# Patient Record
Sex: MALE | Race: WHITE | NOT HISPANIC OR LATINO | Employment: OTHER | URBAN - NONMETROPOLITAN AREA
[De-identification: names, ages, dates, MRNs, and addresses within clinical notes are randomized per-mention and may not be internally consistent; named-entity substitution may affect disease eponyms.]

---

## 2018-01-02 ENCOUNTER — HOSPITAL ENCOUNTER (EMERGENCY)
Facility: HOSPITAL | Age: 68
Discharge: HOME | End: 2018-01-02
Attending: PHYSICIAN ASSISTANT | Admitting: PHYSICIAN ASSISTANT
Payer: COMMERCIAL

## 2018-01-02 VITALS
HEIGHT: 71 IN | TEMPERATURE: 97.3 F | SYSTOLIC BLOOD PRESSURE: 144 MMHG | OXYGEN SATURATION: 94 % | DIASTOLIC BLOOD PRESSURE: 79 MMHG | RESPIRATION RATE: 20 BRPM

## 2018-01-02 DIAGNOSIS — J01.90 ACUTE RHINOSINUSITIS: ICD-10-CM

## 2018-01-02 DIAGNOSIS — H69.92 DYSFUNCTION OF LEFT EUSTACHIAN TUBE: ICD-10-CM

## 2018-01-02 LAB
DEPRECATED S PYO AG THROAT QL EIA: NORMAL
FLUAV+FLUBV AG SPEC QL: NEGATIVE
FLUAV+FLUBV AG SPEC QL: NEGATIVE
SPECIMEN SOURCE: NORMAL
SPECIMEN SOURCE: NORMAL

## 2018-01-02 PROCEDURE — 87081 CULTURE SCREEN ONLY: CPT | Performed by: FAMILY MEDICINE

## 2018-01-02 PROCEDURE — G0463 HOSPITAL OUTPT CLINIC VISIT: HCPCS

## 2018-01-02 PROCEDURE — 87804 INFLUENZA ASSAY W/OPTIC: CPT | Mod: 59 | Performed by: FAMILY MEDICINE

## 2018-01-02 PROCEDURE — 87880 STREP A ASSAY W/OPTIC: CPT | Performed by: FAMILY MEDICINE

## 2018-01-02 PROCEDURE — 99202 OFFICE O/P NEW SF 15 MIN: CPT | Performed by: PHYSICIAN ASSISTANT

## 2018-01-02 RX ORDER — DOXYCYCLINE 100 MG/1
100 CAPSULE ORAL 2 TIMES DAILY
Qty: 10 CAPSULE | Refills: 0 | Status: SHIPPED | OUTPATIENT
Start: 2018-01-02 | End: 2018-01-07

## 2018-01-02 RX ORDER — PREDNISONE 20 MG/1
40 TABLET ORAL DAILY
Qty: 6 TABLET | Refills: 0 | Status: SHIPPED | OUTPATIENT
Start: 2018-01-02 | End: 2018-01-05

## 2018-01-02 ASSESSMENT — ENCOUNTER SYMPTOMS
COUGH: 1
WHEEZING: 0
DIZZINESS: 0
FEVER: 0
SHORTNESS OF BREATH: 0
LIGHT-HEADEDNESS: 0
SINUS PRESSURE: 1
CHILLS: 0
HEADACHES: 1
RHINORRHEA: 1
TROUBLE SWALLOWING: 0
FATIGUE: 1
PSYCHIATRIC NEGATIVE: 1
EYE DISCHARGE: 0
CHEST TIGHTNESS: 0
SORE THROAT: 0
CARDIOVASCULAR NEGATIVE: 1

## 2018-01-02 NOTE — DISCHARGE INSTRUCTIONS
1. Dry out congestion withprednisone (1spray in both nostrils 2x daily for 3-5 days)   2. Use a saline spray/Neti Pot/sinus flush (Rony Med Sinus Rinse) 2-3 times daily to irrigate sinuses/mucosal tissue. This dilutes and moves secretions.   3. Tylenol or ibuprofen for pain and fevers as needed.   4. Plenty of fluids and rest as needed.   5. Chew, yawn and speak to help eustachian tubes drain.   - tea, honey, Fisherman Friend Extra Strong throat lozenge

## 2018-01-02 NOTE — ED PROVIDER NOTES
"  History     Chief Complaint   Patient presents with     Sinusitis     Started 3 days, throat pain \"and a headache\".     The history is provided by the patient. No  was used.     Kenneth Guan is a 67 year old male who presents with congestion, headache, sore throat, throat clearing, body aches. Nasal drainage is profuse. No fevers. Madisyn renita, dayquil not helping. Wife had influenza.     Problem List:    Patient Active Problem List    Diagnosis Date Noted     Shortness of breath 05/27/2016     Priority: Medium        Past Medical History:    No past medical history on file.    Past Surgical History:    No past surgical history on file.    Family History:    No family history on file.    Social History:  Marital Status:   [2]  Social History   Substance Use Topics     Smoking status: Not on file     Smokeless tobacco: Not on file     Alcohol use Not on file        Medications:      predniSONE (DELTASONE) 20 MG tablet   doxycycline (VIBRAMYCIN) 100 MG capsule   HOLD MEDICATION   cephALEXin (KEFLEX) 500 MG capsule   METFORMIN HCL PO   LISINOPRIL PO   ATENOLOL PO   SIMVASTATIN PO   adalimumab (HUMIRA) 40 MG/0.8ML injection   clobetasol (TEMOVATE) 0.05 % cream   PRAMIPEXOLE DIHYDROCHLORIDE PO   ASPIRIN ADULT LOW STRENGTH PO   glucosamine-chondroitin 500-400 MG CAPS   UNABLE TO FIND   UNABLE TO FIND         Review of Systems   Constitutional: Positive for fatigue. Negative for chills and fever.   HENT: Positive for congestion, postnasal drip, rhinorrhea and sinus pressure. Negative for ear pain, sore throat and trouble swallowing.    Eyes: Negative for discharge.   Respiratory: Positive for cough. Negative for chest tightness, shortness of breath and wheezing.    Cardiovascular: Negative.    Skin: Negative.    Neurological: Positive for headaches. Negative for dizziness and light-headedness.   Psychiatric/Behavioral: Negative.        Physical Exam   BP: 144/79  Heart Rate: 64  Temp: 97.3 " " F (36.3  C)  Resp: 20  Height: 180.3 cm (5' 11\")  SpO2: 94 %      Physical Exam   Constitutional: He is oriented to person, place, and time. He appears well-developed and well-nourished. No distress.   HENT:   Head: Normocephalic and atraumatic.   Nose: Right sinus exhibits frontal sinus tenderness. Left sinus exhibits frontal sinus tenderness.   Mouth/Throat: No trismus in the jaw. Posterior oropharyngeal erythema present. No posterior oropharyngeal edema.   Eyes: Conjunctivae are normal.   Cardiovascular: Normal rate and normal heart sounds.    Pulmonary/Chest: Effort normal and breath sounds normal.   Neurological: He is alert and oriented to person, place, and time.   Skin: Skin is warm and dry.   Psychiatric: He has a normal mood and affect.   Nursing note and vitals reviewed.      ED Course     ED Course     Procedures    Labs Ordered and Resulted from Time of ED Arrival Up to the Time of Departure from the ED   RAPID STREP SCREEN   BETA STREP GROUP A CULTURE   INFLUENZA A/B ANTIGEN       Assessments & Plan (with Medical Decision Making)     I have reviewed the nursing notes.  I have reviewed the findings, diagnosis, plan and need for follow up with the patient.    New Prescriptions    DOXYCYCLINE (VIBRAMYCIN) 100 MG CAPSULE    Take 1 capsule (100 mg) by mouth 2 times daily for 5 days    PREDNISONE (DELTASONE) 20 MG TABLET    Take 2 tablets (40 mg) by mouth daily for 3 days       Final diagnoses:   Acute rhinosinusitis   Dysfunction of left eustachian tube   Will treat with prednisone and continue nasal flush. Gargle and honey for sore throat. If symptoms persist after Tx above will start doxy vs f/u with PCP. Will seek attention sooner with worseningor concerns. Patient verbally educated and given appropriate education sheets for each of the diagnoses and has no questions.    Nikunj Aleman PA-C   1/2/2018   3:01 PM    1/2/2018   [facility]     Nikunj Aleman PA  01/02/18 1501    "

## 2018-01-02 NOTE — ED AVS SNAPSHOT
[facility]    [address] [address]    Phone:  [phone]                                       Kenneth Guan   MRN: [id number]    Department:  [facility]   Date of Visit:  1/2/2018           Patient Information     Date Of Birth          1950        Your diagnoses for this visit were:     Acute rhinosinusitis     Dysfunction of left eustachian tube        You were seen by Nikunj Aleman PA.      Follow-up Information     Follow up with Jesus López MD In 1 week.    Specialty:  Family Practice    Contact information:    [facility]  [address]  [facility] [address]  [phone]          Follow up with [facility].    Specialty:  EMERGENCY MEDICINE    Why:  If symptoms worsen    Contact information:    [address] [address] [phone]    Additional information:    From [facility]: Take [address]. Turn left at [address]. Turn left at the first stoplight on [address] [place] [address]. At the first stop sign, take a right onto [address]. Take a left into the parking lot and continue through until you reach the North enterance of the building.       From [place]: Take [address]. Take the [address] ramp towards [place]. Turn left onto [address]. Take a slight right onto [address]. Turn left at the first stoplight on [address]. At the first stop sign, take a right onto [address]. Take a left into the parking lot and continue through until you reach the North enterance of the building.       From [place]: Take [address]. Take a right at [address]. At the first stop sign, take a right onto [address]. Take a left into the parking lot and continue through until you reach the North enterance of the building.         Discharge Instructions       1. Dry out congestion withprednisone (1spray in both nostrils 2x daily for 3-5  days)   2. Use a saline spray/Neti Pot/sinus flush (Rony Med Sinus Rinse) 2-3 times daily to irrigate sinuses/mucosal tissue. This dilutes and moves secretions.   3. Tylenol or ibuprofen for pain and fevers as needed.   4. Plenty of fluids and rest as needed.   5. Chew, yawn and speak to help eustachian tubes drain.   - tea, honey, Fisherman Friend Extra Strong throat lozenge      Discharge References/Attachments     SINUSITIS, ACUTE (ENGLISH)         Review of your medicines      START taking        Dose / Directions Last dose taken    doxycycline 100 MG capsule   Commonly known as:  VIBRAMYCIN   Dose:  100 mg   Quantity:  10 capsule        Take 1 capsule (100 mg) by mouth 2 times daily for 5 days   Refills:  0        predniSONE 20 MG tablet   Commonly known as:  DELTASONE   Dose:  40 mg   Quantity:  6 tablet        Take 2 tablets (40 mg) by mouth daily for 3 days   Refills:  0          Our records show that you are taking the medicines listed below. If these are incorrect, please call your family doctor or clinic.        Dose / Directions Last dose taken    adalimumab 40 MG/0.8ML injection   Commonly known as:  Humira   Dose:  80 mg        Inject 80 mg Subcutaneous once Takes on fridays. Due next 8/1/14   Refills:  0        ASPIRIN ADULT LOW STRENGTH PO   Dose:  81 mg        Take 81 mg by mouth daily   Refills:  0        ATENOLOL PO   Dose:  50 mg        Take 50 mg by mouth daily   Refills:  0        cephALEXin 500 MG capsule   Commonly known as:  KEFLEX   Dose:  500 mg   Quantity:  30 capsule        Take 1 capsule (500 mg) by mouth 3 times daily   Refills:  0        clobetasol 0.05 % cream   Commonly known as:  TEMOVATE        Apply topically daily as needed   Refills:  0        glucosamine-chondroitin 500-400 MG Caps per capsule   Dose:  2 capsule        Take 2 capsules by mouth daily   Refills:  0        HOLD MEDICATION        Hold metformin until Sunday 5/29/16   Refills:  0        LISINOPRIL PO   Dose:  25 mg         Take 25 mg by mouth daily   Refills:  0        METFORMIN HCL PO   Dose:  1000 mg        Take 1,000 mg by mouth 2 times daily (with meals)   Refills:  0        PRAMIPEXOLE DIHYDROCHLORIDE PO   Dose:  0.125 mg        Take 0.125 mg by mouth daily Takes 2 tabs one hour before bed for restless legs.   Refills:  0        SIMVASTATIN PO   Dose:  80 mg        Take 80 mg by mouth daily   Refills:  0        * UNABLE TO FIND        Multiple vitamin one daily   Refills:  0        * UNABLE TO FIND        Take by mouth daily Fish oil/flax seed combination takes one daily   Refills:  0        * Notice:  This list has 2 medication(s) that are the same as other medications prescribed for you. Read the directions carefully, and ask your doctor or other care provider to review them with you.            Prescriptions were sent or printed at these locations (2 Prescriptions)                   [facility]  [address]   [address] [facility] [place] [address]    Telephone:  [phone]   Fax:  [phone]   Hours:                  E-Prescribed (1 of 2)         predniSONE (DELTASONE) 20 MG tablet                 Printed at Department/Unit printer (1 of 2)         doxycycline (VIBRAMYCIN) 100 MG capsule                Procedures and tests performed during your visit     Beta strep group A culture    Influenza A/B antigen    Rapid strep screen      Orders Needing Specimen Collection     None      Pending Results     Date and Time Order Name Status Description    1/2/2018 1346 Beta strep group A culture In process             Pending Culture Results     Date and Time Order Name Status Description    1/2/2018 1346 Beta strep group A culture In process             Thank you for choosing [facility]       Thank you for choosing [facility] for your care. Our goal is always to provide you with excellent care. Hearing back from our patients is one way we can continue to improve our services. Please take a few minutes  "to complete the written survey that you may receive in the mail after you visit with us. Thank you!        [employer] Information     [employer] lets you send messages to your doctor, view your test results, renew your prescriptions, schedule appointments and more. To sign up, go to [email or web address] . Click on \"Log in\" on the left side of the screen, which will take you to the Welcome page. Then click on \"Sign up Now\" on the right side of the page.     You will be asked to enter the access code listed below, as well as some personal information. Please follow the directions to create your username and password.     Your access code is: [email or web address]  Expires: 2018  2:56 PM     Your access code will  in 90 days. If you need help or a new code, please call your [place] clinic or [phone].        Care EveryWhere ID     This is your Care EveryWhere ID. This could be used by other organizations to access your [place] medical records  [id number]        Equal Access to Services     : Hadii marycruz nuno Soazalia, waaxda luqadaha, qaybta kaalmapam penn, richard tovar . So [facility] [phone].    ATENCIÓN: Si habla español, tiene a clements disposición servicios gratuitos de asistencia lingüística. Llame al [phone].    We comply with applicable federal civil rights laws and [place] laws. We do not discriminate on the basis of race, color, national origin, age, disability, sex, sexual orientation, or gender identity.            After Visit Summary       This is your record. Keep this with you and show to your community pharmacist(s) and doctor(s) at your next visit.                  "

## 2018-01-02 NOTE — ED NOTES
Patient presents with headache, sinus pressure and sore throat X 3 days.  Wife tested positive for flu.

## 2018-01-02 NOTE — ED AVS SNAPSHOT
[facility]    [address] [address]    Phone:  [phone]                                       Kenneth Guan   MRN: [id number]    Department:  [facility]   Date of Visit:  1/2/2018           After Visit Summary Signature Page     I have received my discharge instructions, and my questions have been answered. I have discussed any challenges I see with this plan with the nurse or doctor.    ..........................................................................................................................................  Patient/Patient Representative Signature      ..........................................................................................................................................  Patient Representative Print Name and Relationship to Patient    ..................................................               ................................................  Date                                            Time    ..........................................................................................................................................  Reviewed by Signature/Title    ...................................................              ..............................................  Date                                                            Time

## 2018-01-04 LAB
BACTERIA SPEC CULT: NORMAL
SPECIMEN SOURCE: NORMAL

## 2019-09-06 ENCOUNTER — APPOINTMENT (OUTPATIENT)
Dept: MRI IMAGING | Facility: HOSPITAL | Age: 69
End: 2019-09-06
Attending: FAMILY MEDICINE
Payer: COMMERCIAL

## 2019-09-06 ENCOUNTER — HOSPITAL ENCOUNTER (EMERGENCY)
Facility: HOSPITAL | Age: 69
Discharge: HOME | End: 2019-09-06
Attending: FAMILY MEDICINE | Admitting: FAMILY MEDICINE
Payer: COMMERCIAL

## 2019-09-06 VITALS
OXYGEN SATURATION: 93 % | HEART RATE: 72 BPM | RESPIRATION RATE: 16 BRPM | TEMPERATURE: 98.5 F | DIASTOLIC BLOOD PRESSURE: 71 MMHG | SYSTOLIC BLOOD PRESSURE: 127 MMHG

## 2019-09-06 DIAGNOSIS — H81.11 BENIGN PAROXYSMAL POSITIONAL VERTIGO OF RIGHT EAR: ICD-10-CM

## 2019-09-06 LAB
ALBUMIN SERPL-MCNC: 3.7 G/DL (ref 3.4–5)
ALBUMIN UR-MCNC: 10 MG/DL
ALP SERPL-CCNC: 66 U/L (ref 40–150)
ALT SERPL W P-5'-P-CCNC: 47 U/L (ref 0–70)
ANION GAP SERPL CALCULATED.3IONS-SCNC: 10 MMOL/L (ref 3–14)
APPEARANCE UR: CLEAR
AST SERPL W P-5'-P-CCNC: 42 U/L (ref 0–45)
BACTERIA #/AREA URNS HPF: ABNORMAL /HPF
BASOPHILS # BLD AUTO: 0 10E9/L (ref 0–0.2)
BASOPHILS NFR BLD AUTO: 0.6 %
BILIRUB SERPL-MCNC: 0.4 MG/DL (ref 0.2–1.3)
BILIRUB UR QL STRIP: NEGATIVE
BUN SERPL-MCNC: 16 MG/DL (ref 7–30)
CALCIUM SERPL-MCNC: 8.9 MG/DL (ref 8.5–10.1)
CHLORIDE SERPL-SCNC: 106 MMOL/L (ref 94–109)
CO2 SERPL-SCNC: 22 MMOL/L (ref 20–32)
COLOR UR AUTO: YELLOW
CREAT SERPL-MCNC: 1.02 MG/DL (ref 0.66–1.25)
CRP SERPL-MCNC: <2.9 MG/L (ref 0–8)
DIFFERENTIAL METHOD BLD: ABNORMAL
EOSINOPHIL # BLD AUTO: 0.1 10E9/L (ref 0–0.7)
EOSINOPHIL NFR BLD AUTO: 1.4 %
ERYTHROCYTE [DISTWIDTH] IN BLOOD BY AUTOMATED COUNT: 12.5 % (ref 10–15)
GFR SERPL CREATININE-BSD FRML MDRD: 75 ML/MIN/{1.73_M2}
GLUCOSE SERPL-MCNC: 227 MG/DL (ref 70–99)
GLUCOSE UR STRIP-MCNC: >1000 MG/DL
HCT VFR BLD AUTO: 37.6 % (ref 40–53)
HGB BLD-MCNC: 13.2 G/DL (ref 13.3–17.7)
HGB UR QL STRIP: ABNORMAL
HYALINE CASTS #/AREA URNS LPF: 90 /LPF
IMM GRANULOCYTES # BLD: 0 10E9/L (ref 0–0.4)
IMM GRANULOCYTES NFR BLD: 0.4 %
KETONES UR STRIP-MCNC: NEGATIVE MG/DL
LEUKOCYTE ESTERASE UR QL STRIP: NEGATIVE
LYMPHOCYTES # BLD AUTO: 1.6 10E9/L (ref 0.8–5.3)
LYMPHOCYTES NFR BLD AUTO: 30.6 %
MAGNESIUM SERPL-MCNC: 1.9 MG/DL (ref 1.6–2.3)
MCH RBC QN AUTO: 33.3 PG (ref 26.5–33)
MCHC RBC AUTO-ENTMCNC: 35.1 G/DL (ref 31.5–36.5)
MCV RBC AUTO: 95 FL (ref 78–100)
MONOCYTES # BLD AUTO: 0.5 10E9/L (ref 0–1.3)
MONOCYTES NFR BLD AUTO: 8.7 %
MUCOUS THREADS #/AREA URNS LPF: PRESENT /LPF
NEUTROPHILS # BLD AUTO: 3 10E9/L (ref 1.6–8.3)
NEUTROPHILS NFR BLD AUTO: 58.3 %
NITRATE UR QL: NEGATIVE
NRBC # BLD AUTO: 0 10*3/UL
NRBC BLD AUTO-RTO: 0 /100
PH UR STRIP: 5 PH (ref 4.7–8)
PLATELET # BLD AUTO: 129 10E9/L (ref 150–450)
POTASSIUM SERPL-SCNC: 4.6 MMOL/L (ref 3.4–5.3)
PROT SERPL-MCNC: 7.5 G/DL (ref 6.8–8.8)
RBC # BLD AUTO: 3.96 10E12/L (ref 4.4–5.9)
RBC #/AREA URNS AUTO: 7 /HPF (ref 0–2)
SODIUM SERPL-SCNC: 138 MMOL/L (ref 133–144)
SOURCE: ABNORMAL
SP GR UR STRIP: 1.02 (ref 1–1.03)
SQUAMOUS #/AREA URNS AUTO: 2 /HPF (ref 0–1)
UROBILINOGEN UR STRIP-MCNC: NORMAL MG/DL (ref 0–2)
WBC # BLD AUTO: 5.2 10E9/L (ref 4–11)
WBC #/AREA URNS AUTO: 3 /HPF (ref 0–5)

## 2019-09-06 PROCEDURE — 25000128 H RX IP 250 OP 636: Performed by: FAMILY MEDICINE

## 2019-09-06 PROCEDURE — 85025 COMPLETE CBC W/AUTO DIFF WBC: CPT | Performed by: FAMILY MEDICINE

## 2019-09-06 PROCEDURE — 99285 EMERGENCY DEPT VISIT HI MDM: CPT | Mod: Z6 | Performed by: FAMILY MEDICINE

## 2019-09-06 PROCEDURE — 80053 COMPREHEN METABOLIC PANEL: CPT | Performed by: FAMILY MEDICINE

## 2019-09-06 PROCEDURE — 70553 MRI BRAIN STEM W/O & W/DYE: CPT | Mod: TC

## 2019-09-06 PROCEDURE — 25500064 ZZH RX 255 OP 636: Performed by: RADIOLOGY

## 2019-09-06 PROCEDURE — 99285 EMERGENCY DEPT VISIT HI MDM: CPT | Mod: 25

## 2019-09-06 PROCEDURE — 93010 ELECTROCARDIOGRAM REPORT: CPT | Performed by: INTERNAL MEDICINE

## 2019-09-06 PROCEDURE — 36415 COLL VENOUS BLD VENIPUNCTURE: CPT | Performed by: FAMILY MEDICINE

## 2019-09-06 PROCEDURE — 93005 ELECTROCARDIOGRAM TRACING: CPT

## 2019-09-06 PROCEDURE — 86140 C-REACTIVE PROTEIN: CPT | Performed by: FAMILY MEDICINE

## 2019-09-06 PROCEDURE — 25000132 ZZH RX MED GY IP 250 OP 250 PS 637: Performed by: FAMILY MEDICINE

## 2019-09-06 PROCEDURE — 81001 URINALYSIS AUTO W/SCOPE: CPT | Performed by: FAMILY MEDICINE

## 2019-09-06 PROCEDURE — 83735 ASSAY OF MAGNESIUM: CPT | Performed by: FAMILY MEDICINE

## 2019-09-06 PROCEDURE — A9585 GADOBUTROL INJECTION: HCPCS | Performed by: RADIOLOGY

## 2019-09-06 RX ORDER — GADOBUTROL 604.72 MG/ML
10 INJECTION INTRAVENOUS ONCE
Status: COMPLETED | OUTPATIENT
Start: 2019-09-06 | End: 2019-09-06

## 2019-09-06 RX ORDER — DIAZEPAM 5 MG
10 TABLET ORAL ONCE
Status: COMPLETED | OUTPATIENT
Start: 2019-09-06 | End: 2019-09-06

## 2019-09-06 RX ORDER — GLIMEPIRIDE 4 MG/1
8 TABLET ORAL
COMMUNITY
Start: 2019-04-24

## 2019-09-06 RX ORDER — SODIUM CHLORIDE 9 MG/ML
1000 INJECTION, SOLUTION INTRAVENOUS CONTINUOUS
Status: DISCONTINUED | OUTPATIENT
Start: 2019-09-06 | End: 2019-09-06 | Stop reason: HOSPADM

## 2019-09-06 RX ORDER — MECLIZINE HCL 12.5 MG 12.5 MG/1
12.5 TABLET ORAL 4 TIMES DAILY PRN
Qty: 30 TABLET | Refills: 0 | COMMUNITY
Start: 2019-09-06 | End: 2020-05-15

## 2019-09-06 RX ORDER — CARVEDILOL 25 MG/1
25 TABLET ORAL 2 TIMES DAILY WITH MEALS
COMMUNITY

## 2019-09-06 RX ADMIN — DIAZEPAM 10 MG: 5 TABLET ORAL at 15:43

## 2019-09-06 RX ADMIN — GADOBUTROL 10 ML: 604.72 INJECTION INTRAVENOUS at 17:25

## 2019-09-06 RX ADMIN — SODIUM CHLORIDE 1000 ML: 9 INJECTION, SOLUTION INTRAVENOUS at 15:28

## 2019-09-06 ASSESSMENT — ENCOUNTER SYMPTOMS
DIFFICULTY URINATING: 1
MUSCULOSKELETAL NEGATIVE: 1
DIARRHEA: 0
DIAPHORESIS: 0
FREQUENCY: 1
VOMITING: 0
FATIGUE: 0
ACTIVITY CHANGE: 1
NAUSEA: 0
HEADACHES: 0
LIGHT-HEADEDNESS: 1
SHORTNESS OF BREATH: 0
FEVER: 0
ABDOMINAL PAIN: 0
CONSTIPATION: 0
DECREASED CONCENTRATION: 1
PALPITATIONS: 0

## 2019-09-06 NOTE — ED AVS SNAPSHOT
[facility]  [address] [address]  Phone:  [phone]                                    Kenneth Guan   MRN: [id number]    Department[facility]  [facility]   Date of Visit:  9/6/2019           After Visit Summary Signature Page    I have received my discharge instructions, and my questions have been answered. I have discussed any challenges I see with this plan with the nurse or doctor.    ..........................................................................................................................................  Patient/Patient Representative Signature      ..........................................................................................................................................  Patient Representative Print Name and Relationship to Patient    ..................................................               ................................................  Date                                   Time    ..........................................................................................................................................  Reviewed by Signature/Title    ...................................................              ..............................................  Date                                               Time          22EPIC Rev 08/18

## 2019-09-06 NOTE — ED PROVIDER NOTES
History     Chief Complaint   Patient presents with     Dizziness     HPI  Kenneth Guan is a 69 year old male who was out [place] the golf [place] having difficulty balancing, states he is been having dizziness and difficulty focusing his eyes on and off for about a week, that is worse today.  He is a diabetic, he has had a thorough work-up by his primary care for the same problem in the last year.  Patient does not know what medications he is on, states his wife has been managing his medications since he had an episode in December, is not very clear but with that episode might of been.  Documentation seems to reflect meningitis and obstructive sleep apnea, patient also has cardiomyopathy.  This seems to be improved after medication and his ejection fraction is now nearly normal.  Patient is seen in the [facility] balance clinic, was given a walker but has not been using it.  Issues at that time seem to be very similar to what he is having now.    Allergies:  Allergies   Allergen Reactions     Cyclosporine      Muscle aches       Problem List:    Patient Active Problem List    Diagnosis Date Noted     Shortness of breath 05/27/2016     Priority: Medium        Past Medical History:    No past medical history on file.    Past Surgical History:    No past surgical history on file.    Family History:    No family history on file.    Social History:  Marital Status:   [2]  Social History     Tobacco Use     Smoking status: Not on file   Substance Use Topics     Alcohol use: Not on file     Drug use: Not on file        Medications:      ASPIRIN ADULT LOW STRENGTH PO   carvedilol (COREG) 25 MG tablet   clobetasol (TEMOVATE) 0.05 % cream   glimepiride (AMARYL) 4 MG tablet   glucosamine-chondroitin 500-400 MG CAPS   LISINOPRIL PO   meclizine (ANTIVERT) 12.5 MG tablet   METFORMIN HCL PO   PRAMIPEXOLE DIHYDROCHLORIDE PO   SIMVASTATIN PO   UNABLE TO FIND   UNABLE TO FIND   adalimumab (HUMIRA) 40 MG/0.8ML injection    ATENOLOL PO   cephALEXin (KEFLEX) 500 MG capsule   HOLD MEDICATION         Review of Systems   Constitutional: Positive for activity change. Negative for diaphoresis, fatigue and fever.   HENT: Negative.    Eyes: Positive for visual disturbance.   Respiratory: Negative for shortness of breath.    Cardiovascular: Negative for chest pain and palpitations.   Gastrointestinal: Negative for abdominal pain, constipation, diarrhea, nausea and vomiting.   Genitourinary: Positive for difficulty urinating and frequency. Negative for urgency.        States he voids and then stands up and feels like he has to void again.  Has not had his prostate checked recently.   Musculoskeletal: Negative.    Skin: Negative.    Neurological: Positive for light-headedness. Negative for headaches.        Balance issues   Psychiatric/Behavioral: Positive for decreased concentration.       Physical Exam   BP: 147/82  Pulse: 72  Heart Rate: 72  Temp: 98.5  F (36.9  C)  Resp: 16  SpO2: 97 %      Physical Exam   Constitutional: He is oriented to person, place, and time. He appears well-developed and well-nourished. He appears distressed.   HENT:   Head: Normocephalic and atraumatic.   Eyes: Pupils are equal, round, and reactive to light. EOM are normal.   Neck: Normal range of motion. Neck supple.   Cardiovascular: Normal rate, regular rhythm and normal heart sounds.   No murmur heard.  Pulmonary/Chest: Effort normal and breath sounds normal. No respiratory distress.   Abdominal: Soft. Bowel sounds are normal. He exhibits no distension. There is no tenderness.   No suprapubic tenderness on palpation   Musculoskeletal: Normal range of motion. He exhibits no edema.   Neurological: He is alert and oriented to person, place, and time. No cranial nerve deficit or sensory deficit. He exhibits normal muscle tone.   Skin: Skin is warm and dry. Capillary refill takes less than 2 seconds.   Psychiatric: His speech is normal. His affect is blunt. Cognition  and memory are normal.   Nursing note and vitals reviewed.      ED Course   Procedures         EKG Interpretation:      Interpreted by Mayela Joel MD  Time reviewed: 1505  Symptoms at time of EKG: dizziness, blurred vision   Rhythm: normal sinus   Rate: normal  Axis: normal  Ectopy: none  Conduction: normal  ST Segments/ T Waves: No ST-T wave changes  Q Waves: none  Comparison to prior: Unchanged excepting rate    Clinical Impression: normal EKG    Results for orders placed or performed during the hospital encounter of 09/06/19 (from the past 24 hour(s))   UA with Microscopic reflex to Culture   Result Value Ref Range    Color Urine Yellow     Appearance Urine Clear     Glucose Urine >1000 (A) NEG^Negative mg/dL    Bilirubin Urine Negative NEG^Negative    Ketones Urine Negative NEG^Negative mg/dL    Specific Gravity Urine 1.017 1.003 - 1.035    Blood Urine Small (A) NEG^Negative    pH Urine 5.0 4.7 - 8.0 pH    Protein Albumin Urine 10 (A) NEG^Negative mg/dL    Urobilinogen mg/dL Normal 0.0 - 2.0 mg/dL    Nitrite Urine Negative NEG^Negative    Leukocyte Esterase Urine Negative NEG^Negative    Source Midstream Urine     WBC Urine 3 0 - 5 /HPF    RBC Urine 7 (H) 0 - 2 /HPF    Bacteria Urine None (A) NEG^Negative /HPF    Squamous Epithelial /HPF Urine 2 (H) 0 - 1 /HPF    Mucous Urine Present (A) NEG^Negative /LPF    Hyaline Casts 90 (A) OTO2^O - 2 /LPF   CBC with platelets differential   Result Value Ref Range    WBC 5.2 4.0 - 11.0 10e9/L    RBC Count 3.96 (L) 4.4 - 5.9 10e12/L    Hemoglobin 13.2 (L) 13.3 - 17.7 g/dL    Hematocrit 37.6 (L) 40.0 - 53.0 %    MCV 95 78 - 100 fl    MCH 33.3 (H) 26.5 - 33.0 pg    MCHC 35.1 31.5 - 36.5 g/dL    RDW 12.5 10.0 - 15.0 %    Platelet Count 129 (L) 150 - 450 10e9/L    Diff Method Automated Method     % Neutrophils 58.3 %    % Lymphocytes 30.6 %    % Monocytes 8.7 %    % Eosinophils 1.4 %    % Basophils 0.6 %    % Immature Granulocytes 0.4 %    Nucleated RBCs 0 0 /100     Absolute Neutrophil 3.0 1.6 - 8.3 10e9/L    Absolute Lymphocytes 1.6 0.8 - 5.3 10e9/L    Absolute Monocytes 0.5 0.0 - 1.3 10e9/L    Absolute Eosinophils 0.1 0.0 - 0.7 10e9/L    Absolute Basophils 0.0 0.0 - 0.2 10e9/L    Abs Immature Granulocytes 0.0 0 - 0.4 10e9/L    Absolute Nucleated RBC 0.0    CRP inflammation   Result Value Ref Range    CRP Inflammation <2.9 0.0 - 8.0 mg/L   Comprehensive metabolic panel   Result Value Ref Range    Sodium 138 133 - 144 mmol/L    Potassium 4.6 3.4 - 5.3 mmol/L    Chloride 106 94 - 109 mmol/L    Carbon Dioxide 22 20 - 32 mmol/L    Anion Gap 10 3 - 14 mmol/L    Glucose 227 (H) 70 - 99 mg/dL    Urea Nitrogen 16 7 - 30 mg/dL    Creatinine 1.02 0.66 - 1.25 mg/dL    GFR Estimate 75 >60 mL/min/[1.73_m2]    GFR Estimate If Black 87 >60 mL/min/[1.73_m2]    Calcium 8.9 8.5 - 10.1 mg/dL    Bilirubin Total 0.4 0.2 - 1.3 mg/dL    Albumin 3.7 3.4 - 5.0 g/dL    Protein Total 7.5 6.8 - 8.8 g/dL    Alkaline Phosphatase 66 40 - 150 U/L    ALT 47 0 - 70 U/L    AST 42 0 - 45 U/L   Magnesium   Result Value Ref Range    Magnesium 1.9 1.6 - 2.3 mg/dL   MR Brain w/o & w Contrast    Narrative    EXAM:  MR BRAIN W/O & W CONTRAST    HISTORY:  vertigo, vision change.      TECHNIQUE:  Sagittal T1, axial T2 and FLAIR, gradient echo, axial T1,  postcontrast 3-D T1, diffusion-weighted MR imaging of the brain was  performed.    COMPARISON:  None.     FINDINGS:    The ventricles and sulci are normal.  No acute intracranial hemorrhage  or abnormal extra-axial fluid. No mass effect or midline shift.    The parenchymal signal is within normal limits. No abnormal  enhancement or enhancing intracranial mass. No restricted diffusion to  suggest acute ischemia.  The major vascular flow voids are maintained.      There is partial opacification of the mastoid air cells bilaterally.  The bone marrow signal intensity is within normal limits.      Impression    IMPRESSION: No acute intracranial findings.    PABLO ALONSO,  MD       Medications   0.9% sodium chloride BOLUS (0 mLs Intravenous Stopped 9/6/19 1628)     Followed by   sodium chloride 0.9% infusion (has no administration in time range)   diazepam (VALIUM) tablet 10 mg (10 mg Oral Given 9/6/19 1543)   gadobutrol (GADAVIST) injection 10 mL (10 mLs Intravenous Given 9/6/19 1725)   sodium chloride (PF) 0.9% PF flush 5 mL (5 mLs Intravenous Given 9/6/19 1726)       Assessments & Plan (with Medical Decision Making)   MRI shows no evidence of stroke, there is no evidence for anything but a vestibular disorder.  Since he is already being seen in the [facility] balance clinic is likely that continuing there would be of benefit to him.  He has no major abnormalities in his labs, he is minimally anemic with hemoglobin of 13.2, he does not have a urinary tract infection.  At this time there is no further diagnostics or treatment that should be done in the emergency room.  If he needs further treatment from this he should be seen by his primary care.  He states he will check up with the people in the [facility] about his urinary hesitancy as well.    I have reviewed the nursing notes.    I have reviewed the findings, diagnosis, plan and need for follow up with the patient.  New Prescriptions    MECLIZINE (ANTIVERT) 12.5 MG TABLET    Take 1 tablet (12.5 mg) by mouth 4 times daily as needed for dizziness       Final diagnoses:   Benign paroxysmal positional vertigo of right ear       9/6/2019   [facility]     Mayela Grace MD  09/06/19 4061

## 2019-09-06 NOTE — ED NOTES
"Pt unaware of medications he takes, stated \"my wife does my medications, since my episode in December.\"  "

## 2019-09-06 NOTE — ED NOTES
Pt given 10 mg oral valium, for anxiety of MRI and CT.  Pt has had labs drawn, and UA collected.  Normal Saline 1 liter bolus infusing.

## 2019-09-06 NOTE — ED TRIAGE NOTES
Pt presents with c/o dizziness and difficulty focusing that has been off and on for a week, worse today.

## 2020-05-15 ENCOUNTER — HOSPITAL ENCOUNTER (EMERGENCY)
Facility: HOSPITAL | Age: 70
Discharge: HOME | End: 2020-05-15
Attending: NURSE PRACTITIONER | Admitting: NURSE PRACTITIONER
Payer: COMMERCIAL

## 2020-05-15 VITALS
OXYGEN SATURATION: 96 % | SYSTOLIC BLOOD PRESSURE: 139 MMHG | RESPIRATION RATE: 18 BRPM | HEIGHT: 71 IN | DIASTOLIC BLOOD PRESSURE: 78 MMHG | BODY MASS INDEX: 44.06 KG/M2 | TEMPERATURE: 98.6 F | WEIGHT: 314.7 LBS

## 2020-05-15 DIAGNOSIS — R31.9 BLOOD IN THE URINE: ICD-10-CM

## 2020-05-15 LAB
ALBUMIN UR-MCNC: 100 MG/DL
APPEARANCE UR: ABNORMAL
BACTERIA #/AREA URNS HPF: ABNORMAL /HPF
BILIRUB UR QL STRIP: NEGATIVE
COLOR UR AUTO: ABNORMAL
GLUCOSE UR STRIP-MCNC: >1000 MG/DL
HGB UR QL STRIP: ABNORMAL
KETONES UR STRIP-MCNC: NEGATIVE MG/DL
LEUKOCYTE ESTERASE UR QL STRIP: NEGATIVE
MUCOUS THREADS #/AREA URNS LPF: PRESENT /LPF
NITRATE UR QL: NEGATIVE
PH UR STRIP: 5.5 PH (ref 4.7–8)
RBC #/AREA URNS AUTO: >182 /HPF (ref 0–2)
SOURCE: ABNORMAL
SP GR UR STRIP: 1.03 (ref 1–1.03)
UROBILINOGEN UR STRIP-MCNC: NORMAL MG/DL (ref 0–2)
WBC #/AREA URNS AUTO: 36 /HPF (ref 0–5)
WBC CLUMPS #/AREA URNS HPF: PRESENT /HPF

## 2020-05-15 PROCEDURE — 99213 OFFICE O/P EST LOW 20 MIN: CPT | Mod: Z6 | Performed by: NURSE PRACTITIONER

## 2020-05-15 PROCEDURE — G0463 HOSPITAL OUTPT CLINIC VISIT: HCPCS

## 2020-05-15 PROCEDURE — 81001 URINALYSIS AUTO W/SCOPE: CPT | Performed by: NURSE PRACTITIONER

## 2020-05-15 RX ORDER — LEVOFLOXACIN 500 MG/1
500 TABLET, FILM COATED ORAL DAILY
Qty: 10 TABLET | Refills: 0 | Status: SHIPPED | OUTPATIENT
Start: 2020-05-15 | End: 2020-05-25

## 2020-05-15 ASSESSMENT — ENCOUNTER SYMPTOMS
NEUROLOGICAL NEGATIVE: 1
VOMITING: 0
CHILLS: 0
NAUSEA: 0
FLANK PAIN: 0
DIFFICULTY URINATING: 1
MUSCULOSKELETAL NEGATIVE: 1
FEVER: 0
FREQUENCY: 1
DYSURIA: 0
ACTIVITY CHANGE: 1
HEMATURIA: 1

## 2020-05-15 ASSESSMENT — MIFFLIN-ST. JEOR: SCORE: 2211.66

## 2020-05-15 NOTE — ED AVS SNAPSHOT
[facility]  [address]  [place] [address]  Phone:  [phone]                                    Kenneth Guan   MRN: [id number]    Department:  [facility]   Date of Visit:  5/15/2020           After Visit Summary Signature Page    I have received my discharge instructions, and my questions have been answered. I have discussed any challenges I see with this plan with the nurse or doctor.    ..........................................................................................................................................  Patient/Patient Representative Signature      ..........................................................................................................................................  Patient Representative Print Name and Relationship to Patient    ..................................................               ................................................  Date                                   Time    ..........................................................................................................................................  Reviewed by Signature/Title    ...................................................              ..............................................  Date                                               Time          22EPIC Rev 08/18

## 2020-05-15 NOTE — ED PROVIDER NOTES
History     Chief Complaint   Patient presents with     Hematuria     cystitis and frequency started yesterday. Hematuria today. Denies flank pain.      HPI   Kenneth Guan is a 70 year old male who presents with blood in his urine that started this morning. This is accompanied with frequency, voiding small amounts of urine, and diffiucult urination. History of diabetes and hypertension. Did have epididymitis when he was younger and his left testicle still has some tenderness at times. Denies bladder cancer. Denies fevers, chills, nausea, vomiting, diarrhea, and shortness of breath.    Genitourinary symptoms      Duration: started yesterday    Description:  hesitancy, hematuria and voiding small amounts    Intensity:  moderate    Accompanying signs and symptoms  Hematuria, decreased urine, voiding small amounts, fequency    History  epididymitis   Sexually active: YES    Precipitating or alleviating factors: None    Therapies tried and outcome: none          Allergies:  Allergies   Allergen Reactions     Cyclosporine      Muscle aches       Problem List:    Patient Active Problem List    Diagnosis Date Noted     Shortness of breath 05/27/2016     Priority: Medium        Past Medical History:    History reviewed. No pertinent past medical history.    Past Surgical History:    History reviewed. No pertinent surgical history.    Family History:    No family history on file.    Social History:  Marital Status:   [2]  Social History     Tobacco Use     Smoking status: None   Substance Use Topics     Alcohol use: None     Drug use: None        Medications:    levofloxacin (LEVAQUIN) 500 MG tablet  ASPIRIN ADULT LOW STRENGTH PO  ATENOLOL PO  carvedilol (COREG) 25 MG tablet  clobetasol (TEMOVATE) 0.05 % cream  glimepiride (AMARYL) 4 MG tablet  glucosamine-chondroitin 500-400 MG CAPS  HOLD MEDICATION  LISINOPRIL PO  METFORMIN HCL PO  PRAMIPEXOLE DIHYDROCHLORIDE PO  SIMVASTATIN PO  UNABLE TO FIND  UNABLE TO  "FIND          Review of Systems   Constitutional: Positive for activity change. Negative for chills and fever.   Gastrointestinal: Negative for nausea and vomiting.   Genitourinary: Positive for decreased urine volume (voiding small amounts), difficulty urinating, frequency and hematuria. Negative for discharge, dysuria, flank pain, genital sores, penile pain, penile swelling, scrotal swelling and testicular pain.   Musculoskeletal: Negative.    Neurological: Negative.        Physical Exam   BP: 139/78  Heart Rate: 68  Temp: 98.6  F (37  C)  Resp: 18  Height: 179.1 cm (5' 10.5\")  Weight: 142.7 kg (314 lb 11.2 oz)  SpO2: 96 %      Physical Exam  Vitals signs and nursing note reviewed.   Constitutional:       Appearance: He is overweight.   HENT:      Head: Normocephalic.   Cardiovascular:      Rate and Rhythm: Normal rate.   Pulmonary:      Effort: Pulmonary effort is normal.   Abdominal:      General: Abdomen is protuberant. Bowel sounds are normal. There is no distension.      Tenderness: There is no abdominal tenderness. There is no right CVA tenderness, left CVA tenderness or guarding.      Hernia: No hernia is present.      Comments: Semi firm abdomen   Skin:     General: Skin is warm and dry.   Neurological:      Mental Status: He is alert and oriented to person, place, and time.   Psychiatric:         Behavior: Behavior normal.         ED Course        Procedures             Results for orders placed or performed during the hospital encounter of 05/15/20 (from the past 24 hour(s))   UA reflex to Microscopic and Culture    Specimen: Midstream Urine   Result Value Ref Range    Color Urine Dark Brown     Appearance Urine Cloudy     Glucose Urine >1000 (A) NEG^Negative mg/dL    Bilirubin Urine Negative NEG^Negative    Ketones Urine Negative NEG^Negative mg/dL    Specific Gravity Urine 1.030 1.003 - 1.035    Blood Urine Large (A) NEG^Negative    pH Urine 5.5 4.7 - 8.0 pH    Protein Albumin Urine 100 (A) " NEG^Negative mg/dL    Urobilinogen mg/dL Normal 0.0 - 2.0 mg/dL    Nitrite Urine Negative NEG^Negative    Leukocyte Esterase Urine Negative NEG^Negative    Source Midstream Urine     RBC Urine >182 (H) 0 - 2 /HPF    WBC Urine 36 (H) 0 - 5 /HPF    WBC Clumps Present (A) NEG^Negative /HPF    Bacteria Urine None (A) NEG^Negative /HPF    Mucous Urine Present (A) NEG^Negative /LPF       Medications - No data to display    Assessments & Plan (with Medical Decision Making)     I have reviewed the nursing notes.    I have reviewed the findings, diagnosis, plan and need for follow up with the patient.    (R31.9) Blood in the urine  Comment:70 year old male who presents with blood in his urine that started this morning. This is accompanied with frequency, voiding small amounts of urine, and diffiucult urination. History of diabetes and hypertension. Did have epididymitis when he was younger and his left testicle still has some tenderness at times. Denies bladder cancer. Denies fevers, chills, nausea, vomiting, diarrhea, and shortness of breath.    Assessment: except for protuberant and semi-firm abdomen, his abdominal assessment is negative.    UA: questionable with large amounts of RBC >182 and WBC >36 with WBC clumps. Has >1000 glucose and protein 100.   Awaiting UC.    Plan: Levofloxacin once daily for ten days. Education provided on this/these medication.  Increase fluids, (at 6 to 8 glasses daily unless restricted for there medical reasons).   May use acetaminophen for fever.   May use cranberry juice or over-the-counter cranberry tabs for prevention. It makes the urine acidic so bacteria has a harder time growing.  Avoid sexual intercourse until your symptoms are gone  Levofloxacin as prescribed. Complete all medications even if your symptoms are gone. May take with food unless instructed not to, to prevent stomach upset. Avoid sex until after you have taken all your medication.    PREVENTING FUTURE INFECTIONS: Keep  genital area clean and dry. Personal hygiene wiping from front to back after elimination. Do not hold your urine for long periods of time. Make sure and relieve yourself at least every two hours. Urinate before and right after intercourse to flush out the bladder.  Wear cotton underwear  avoid tight-fitting pants. Avoid caffeine, alcohol, and spicy foods as they can irritate the bladder.    FOLLOW UP Return to this facility or see your doctor if ALL symptoms are not gone after three days of treatment.    GET PROMPT MEDICAL ATTENTION if any of the following occur:  Fever over 101 F (38.3 C), No improvement by the third day of treatment, Increasing back or abdominal pain, vomiting, unable to keep fluids or medicine down, weakness, dizziness, or fainting, vaginal discharge, pain, redness, or swelling of the vaginal area    WATCH FOR SIGNS OF HYPOGLYCEMIA WHILE TAKING LEVOFLOXACIN AND DIABETIC MEDICATIONS    He is going to follow-up with his PCP when he returns to the [facility].  These discharge instructions and medications were reviewed with him and understanding verbalized.    Discharge Medication List as of 5/15/2020  6:08 PM      START taking these medications    Details   levofloxacin (LEVAQUIN) 500 MG tablet Take 1 tablet (500 mg) by mouth daily for 10 days, Disp-10 tablet,R-0, E-Prescribe             Final diagnoses:   Blood in the urine       5/15/2020   [facility]       Adeline Omer, CNP  05/16/20 [id number]

## 2020-05-15 NOTE — ED TRIAGE NOTES
Patient started having frequency yesterday. Today he began having hematuria. Denies flank pain and denies pain with urination.

## 2020-05-15 NOTE — DISCHARGE INSTRUCTIONS
Increase fluids, (at 6 to 8 glasses daily unless restricted for there medical reasons).   May use acetaminophen for fever.   May use cranberry juice or over-the-counter cranberry tabs for prevention. It makes the urine acidic so bacteria has a harder time growing.  Avoid sexual intercourse until your symptoms are gone  Levofloxacin as prescribed. Complete all medications even if your symptoms are gone. May take with food unless instructed not to, to prevent stomach upset. Avoid sex until after you have taken all your medication.    PREVENTING FUTURE INFECTIONS: Keep genital area clean and dry. Personal hygiene wiping from front to back after elimination. Do not hold your urine for long periods of time. Make sure and relieve yourself at least every two hours. Urinate before and right after intercourse to flush out the bladder.  Wear cotton underwear  avoid tight-fitting pants. Avoid caffeine, alcohol, and spicy foods as they can irritate the bladder.    FOLLOW UP Return to this facility or see your doctor if ALL symptoms are not gone after three days of treatment.    GET PROMPT MEDICAL ATTENTION if any of the following occur:  Fever over 101 F (38.3 C), No improvement by the third day of treatment, Increasing back or abdominal pain, vomiting, unable to keep fluids or medicine down, weakness, dizziness, or fainting, vaginal discharge, pain, redness, or swelling of the vaginal area    WATCH FOR SIGNS OF HYPOGLYCEMIA WHILE TAKING LEVOFLOXACIN AND DIABETIC MEDICATIONS

## 2022-06-28 ENCOUNTER — HOSPITAL ENCOUNTER (OUTPATIENT)
Dept: PHYSICAL THERAPY | Facility: HOSPITAL | Age: 72
Setting detail: THERAPIES SERIES
Discharge: HOME | End: 2022-06-28
Attending: INTERNAL MEDICINE
Payer: COMMERCIAL

## 2022-06-28 PROCEDURE — 97161 PT EVAL LOW COMPLEX 20 MIN: CPT | Mod: GP

## 2022-06-28 PROCEDURE — 97110 THERAPEUTIC EXERCISES: CPT | Mod: GP

## 2022-06-28 PROCEDURE — 97140 MANUAL THERAPY 1/> REGIONS: CPT | Mod: GP

## 2022-06-28 PROCEDURE — 97535 SELF CARE MNGMENT TRAINING: CPT | Mod: GP

## 2022-06-28 ASSESSMENT — ACTIVITIES OF DAILY LIVING (ADL)
GO DOWN STAIRS: I AM UNABLE TO DO THE ACTIVITY
LIMPING: THE SYMPTOM PREVENTS ME FROM ALL DAILY ACTIVITIES
KNEEL ON THE FRONT OF YOUR KNEE: I AM UNABLE TO DO THE ACTIVITY
STAND: I AM UNABLE TO DO THE ACTIVITY
SWELLING: THE SYMPTOM PREVENTS ME FROM ALL DAILY ACTIVITIES
AS_A_RESULT_OF_YOUR_KNEE_INJURY,_HOW_WOULD_YOU_RATE_YOUR_CURRENT_LEVEL_OF_DAILY_ACTIVITY?: SEVERELY ABNORMAL
WEAKNESS: THE SYMPTOM PREVENTS ME FROM ALL DAILY ACTIVITIES
KNEE_ACTIVITY_OF_DAILY_LIVING_SCORE: 0
SIT WITH YOUR KNEE BENT: I AM UNABLE TO DO THE ACTIVITY
KNEE_ACTIVITY_OF_DAILY_LIVING_SUM: 0
STIFFNESS: THE SYMPTOM PREVENTS ME FROM ALL DAILY ACTIVITIES
SQUAT: I AM UNABLE TO DO THE ACTIVITY
HOW_WOULD_YOU_RATE_THE_OVERALL_FUNCTION_OF_YOUR_KNEE_DURING_YOUR_USUAL_DAILY_ACTIVITIES?: SEVERELY ABNORMAL
GO UP STAIRS: I AM UNABLE TO DO THE ACTIVITY
RISE FROM A CHAIR: I AM UNABLE TO DO THE ACTIVITY
GIVING WAY, BUCKLING OR SHIFTING OF KNEE: THE SYMPTOM PREVENTS ME FROM ALL DAILY ACTIVITIES
RAW_SCORE: 0
WALK: I AM UNABLE TO DO THE ACTIVITY
HOW_WOULD_YOU_RATE_THE_CURRENT_FUNCTION_OF_YOUR_KNEE_DURING_YOUR_USUAL_DAILY_ACTIVITIES_ON_A_SCALE_FROM_0_TO_100_WITH_100_BEING_YOUR_LEVEL_OF_KNEE_FUNCTION_PRIOR_TO_YOUR_INJURY_AND_0_BEING_THE_INABILITY_TO_PERFORM_ANY_OF_YOUR_USUAL_DAILY_ACTIVITIES?: 0
PAIN: THE SYMPTOM PREVENTS ME FROM ALL DAILY ACTIVITIES

## 2022-06-28 NOTE — PROGRESS NOTES
Initial Physical Therapy Evaluation      Name: Kenneth Guan MRN# [id number]   Age: 72 year old YOB: 1950     Date of Consultation: June 28, 2022  Primary care provider: Rodney Leblanc    Referring Physician: Jesus Martinez MD ([facility])   Orders: Eval and Treat  Medical Diagnosis: Left Knee Osteoarthritis  (M17.12)  Onset of Illness/Injury: 6/16/2022 (Date of referral)    Reason for PT Visit: Patient is a 72 year old y/o Male who presents with left knee pain d/t osteoarthritis. Pt states that he had an injection in May and is hoping to have surgery by the end of August/beginning of September. Pt is wearing offloader brace (medial) that he endorses helps to alleviate a good portion of pain. Pt has had some recent falls that he attributes to knee weakness and some persistent dizziness that is remnant of a bout of bacterial meningitis in 2019.  Prior Level of Function: IND with all ADLs   Pain: 1-9/10  Aching    Community Support/Living Environment/Employment History: Difficulty with stairs. Pt is retired [place] (9years ago).      Patient/Family Goal: Return to golfing    Fall Screen:   Have you fallen 2 or more times in the last year? No  Have you fallen and had an injury in the last year? No  Timed up & go: NT  Is patient a fall risk? No    Past Medical History:   No past medical history on file.    Past Surgical History:  No past surgical history on file.    Medications:   Current Outpatient Medications   Medication Sig     ASPIRIN ADULT LOW STRENGTH PO Take 81 mg by mouth daily     ATENOLOL PO Take 50 mg by mouth daily     carvedilol (COREG) 25 MG tablet Take 25 mg by mouth 2 times daily (with meals)     clobetasol (TEMOVATE) 0.05 % cream Apply topically daily as needed     glimepiride (AMARYL) 4 MG tablet Take 8 mg by mouth     glucosamine-chondroitin 500-400 MG CAPS Take 2 capsules by mouth daily     HOLD MEDICATION Hold metformin until Sunday 5/29/16     LISINOPRIL PO Take 40 mg by  mouth daily      METFORMIN HCL PO Take 1,000 mg by mouth 2 times daily (with meals)     PRAMIPEXOLE DIHYDROCHLORIDE PO Take 0.125 mg by mouth daily Takes 2 tabs one hour before bed for restless legs.     SIMVASTATIN PO Take 80 mg by mouth daily     UNABLE TO FIND Multiple vitamin one daily     UNABLE TO FIND Take by mouth daily Fish oil/flax seed combination takes one daily     No current facility-administered medications for this encounter.       OBJECTIVE   Observation:   Patient appears to PT in no acute distress  Palpation: Pitting edema noted in L ankle/calf with apparent hemosiderin staining along medial shin. Knees comparable in regards to swelling BL. TTP over tibial portion of MCL and medial joint line. NTTP elsewhere  Gait: Severely antalgic  Assistive devices: single end cane to be considered at this or next appt.    Neurological Assessment: deferred      Knee Range of Motion and Strength    ROM  Left: Extension - WFL            Flexion - WFL    Right: Extension - WFL              Flexion - WFL    STRENGTH  Left: Extension - 5/5           Flexion - 5/5    Right: Extension - 5/5             Flexion - 5/5    Hip Abduction 4/5 BL      Special Tests:  KNEE  Lachman's: NEG  Anterior Drawer: NEG  Posterior Drawer: NEG  Varus: POS  Valgus: NEG (relieving)  Nancy's: NEG  End range flexion overpressure: NEG   End range extension overpressure: NEG      Outcome Measures:  KOS performed Score = 0        Prognosis/Plan of Care: Patient has a fair>guarded prognosis for the following goals  Appropriate for Physical Therapy Intervention: Yes     GOALS:   Short-term goals:  To be achieved in 2-4 weeks:    Instruct in home program  1.) Patient will understand biomechanical stressors of the knee joint in order to make modifications to activities to reduce further risk of injury.  2.) Patient will be independent with a short-term home exercise program.    Long-term goals:  To be achieved in 8-10 weeks:    Return to  previous level of function  Self management of symptoms.  Pain free activities of daily living.  1.) Patient will increase bilateral hip abduction strength to 4+/5 bilaterally  2.) Patient will be able to negotiate a flight of stairs with reciprocal pattern independently.  3.) Patient will improve KOS score by 10 points or greater in order to demonstrate a clinically significant change in functional status.    Planned Interventions: Therapeutic exercise, manual therapy, therapeutic activity, patient education    Treatment Rendered/Intervention:  Therapeutic exercise: Patient instructed in and demonstrated proper performance of home exercise program consisting of:    Access Code: [email or web address]  URL: [email or web address]  Date: 06/28/2022  Prepared by: Chai Moralez DPT    Exercises  Supine Heel Slide with Strap - 1 x daily - 5 x weekly - 3 sets - 5-15 reps  Supine Short Arc Quad - 1 x daily - 5 x weekly - 3 sets - 5-15 reps  Long Arc Quad - 1 x daily - 5 x weekly - 3 sets - 5-15 reps  Mini Squat - 1 x daily - 5 x weekly - 3 sets - 5-15 reps  Seated Table Hamstring Stretch - 1 x daily - 5 x weekly - 2mins hold  Ice - 2 x daily - 7 x weekly - 15mins hold    Patient Education  Knee Osteoarthritis    Clinical Impressions:  Criteria for Skilled Therapeutic Intervention Met: Yes  PT Diagnosis: Left knee pain  Influenced by the following impairments: Left knee pain with weightbearing  Functional limitations due to impairment: Difficulties with home and leisure roles  Clinical presentation: Stable/Uncomplicated  Clinical presentation rationale: Clinical reasoning in the absence of compounding factors.  Clinical Decision making (complexity): Low Complexity  Predicted Duration of Therapy Intervention (days/wks): up to 8 weeks  Risks and Benefits of therapy have been explained: Yes  Patient, Family & other staff in agreement with plan of care: Yes  Comments: Patient presents today with signs and symptoms consistent  of left knee medial compartment osteoarthritis. Therapy today consisted of evaluation, patient education, and therapeutic exercise. Patient would benefit from continued PT sessions addressing overall pain and dysfunction with use of appropriate interventions.  Date: 6/28/2022 - 9/26/22    Total Evaluation Time: 20 mins        I certify the need for these services furnished under this plan of treatment and while under my care. (Physician co-signature of this document indicates review and certification of the therapy plan).  Jesus Hutson MD  Fax [phone]    _____________________________     __________________________    ____________  Physician's Signature                 Date               Time

## 2022-07-06 ENCOUNTER — HOSPITAL ENCOUNTER (OUTPATIENT)
Dept: PHYSICAL THERAPY | Facility: HOSPITAL | Age: 72
Setting detail: THERAPIES SERIES
Discharge: HOME | End: 2022-07-06
Attending: INTERNAL MEDICINE
Payer: COMMERCIAL

## 2022-07-06 PROCEDURE — 97140 MANUAL THERAPY 1/> REGIONS: CPT | Mod: GP,CQ

## 2022-07-06 PROCEDURE — 97110 THERAPEUTIC EXERCISES: CPT | Mod: GP,CQ

## 2022-07-08 ENCOUNTER — HOSPITAL ENCOUNTER (OUTPATIENT)
Dept: PHYSICAL THERAPY | Facility: HOSPITAL | Age: 72
Setting detail: THERAPIES SERIES
Discharge: HOME | End: 2022-07-08
Attending: INTERNAL MEDICINE
Payer: COMMERCIAL

## 2022-07-08 PROCEDURE — 97110 THERAPEUTIC EXERCISES: CPT | Mod: GP,CQ

## 2022-07-08 PROCEDURE — 97140 MANUAL THERAPY 1/> REGIONS: CPT | Mod: GP,CQ

## 2022-07-13 ENCOUNTER — HOSPITAL ENCOUNTER (OUTPATIENT)
Dept: PHYSICAL THERAPY | Facility: HOSPITAL | Age: 72
Setting detail: THERAPIES SERIES
Discharge: HOME | End: 2022-07-13
Attending: INTERNAL MEDICINE
Payer: COMMERCIAL

## 2022-07-13 PROCEDURE — 97110 THERAPEUTIC EXERCISES: CPT | Mod: GP,CQ

## 2022-07-13 PROCEDURE — 97140 MANUAL THERAPY 1/> REGIONS: CPT | Mod: GP,CQ

## 2022-07-20 ENCOUNTER — HOSPITAL ENCOUNTER (OUTPATIENT)
Dept: PHYSICAL THERAPY | Facility: HOSPITAL | Age: 72
Setting detail: THERAPIES SERIES
Discharge: HOME | End: 2022-07-20
Attending: INTERNAL MEDICINE
Payer: COMMERCIAL

## 2022-07-20 PROCEDURE — 97140 MANUAL THERAPY 1/> REGIONS: CPT | Mod: GP

## 2022-07-20 PROCEDURE — 97110 THERAPEUTIC EXERCISES: CPT | Mod: GP

## 2022-08-03 ENCOUNTER — HOSPITAL ENCOUNTER (OUTPATIENT)
Dept: PHYSICAL THERAPY | Facility: HOSPITAL | Age: 72
Setting detail: THERAPIES SERIES
Discharge: HOME | End: 2022-08-03
Attending: INTERNAL MEDICINE
Payer: COMMERCIAL

## 2022-08-03 PROCEDURE — 97110 THERAPEUTIC EXERCISES: CPT | Mod: GP,CQ

## 2022-08-03 PROCEDURE — 97140 MANUAL THERAPY 1/> REGIONS: CPT | Mod: GP,CQ

## 2022-08-10 ENCOUNTER — HOSPITAL ENCOUNTER (OUTPATIENT)
Dept: PHYSICAL THERAPY | Facility: HOSPITAL | Age: 72
Setting detail: THERAPIES SERIES
Discharge: HOME | End: 2022-08-10
Attending: INTERNAL MEDICINE
Payer: COMMERCIAL

## 2022-08-10 PROCEDURE — 97140 MANUAL THERAPY 1/> REGIONS: CPT | Mod: GP

## 2022-08-17 ENCOUNTER — HOSPITAL ENCOUNTER (OUTPATIENT)
Dept: PHYSICAL THERAPY | Facility: HOSPITAL | Age: 72
Setting detail: THERAPIES SERIES
Discharge: HOME | End: 2022-08-17
Attending: INTERNAL MEDICINE
Payer: COMMERCIAL

## 2022-08-17 PROCEDURE — 97140 MANUAL THERAPY 1/> REGIONS: CPT | Mod: GP,CQ

## 2023-03-14 NOTE — ED NOTES
"Pt wife stated, he has been to the \"[facility],\" for history of unsteady gait. Pt wife to call back with updated medication list.   " [address] [facility] [place]. [place], [address]   Tel.  [phone]   Fax. [address] [facility] [address]   [place], [address] [facility]   Tel.  [phone]   Fax. [phone] [address]    Tel.  [phone]   Fax. [phone] (: 1969) is a 48 y.o. male, established patient, seen for positive airway pressure follow-up and evaluation. He was last seen by me on 2023, previously seen by Dr. Olga Lidia Delcid on 2021, prior notes reviewed in detail. Home sleep test 2023 showed an overall AHI of 7/hr and lowest oxygen saturation of 84%. He is seen today for follow up. ASSESSMENT/PLAN:    ICD-10-CM ICD-9-CM    1. PAULA (obstructive sleep apnea)  G47.33 327.23 AMB SUPPLY ORDER        AHI = 7 (). Sleep Apnea -  Sleep study results reviewed with patient. He is willing to proceed with a trial of PAP. Order placed and tentative adherence visit to be scheduled. Orders Placed This Encounter    AMB SUPPLY ORDER     Primary Encounter Diagnosis: Obstructive Sleep Apnea  (G47.33)    ResMed Device with Heated Humidifer [id number] / [id number]. Positive Airway Pressure Therapy: Duration of need: 99 months. Set Pressure: 5-15 cmH2O     Nasal Cushion (Replace) 2 per month.  Nasal Interface Mask 1 every 3 months.  Headgear 1 every 6 months.  Filter(s) Disposable 2 per month.  Filter(s) Non-Disposable 1 every 6 months. [facility] [employer] [place] (Replace) 1 every 6 months.  Tubing with heating element 1 every 3 months. Perform Mask Fitting per patient preference and comfort - replace as above. BARTOLOME Burgess, [facility] NPI: [id number]  Electronically signed. 03/15/23     *  Counseling was provided regarding the importance of regular PAP use with emphasis on ensuring sufficient total sleep time, proper sleep hygiene, and safe driving.     * Re-enforced proper and regular cleaning for the device. * He was asked to contact our office for any problems regarding PAP therapy. 2. Recommended a dedicated weight loss program through appropriate diet and exercise regimen as significant weight reduction has been shown to reduce severity of obstructive sleep apnea. SUBJECTIVE/OBJECTIVE:    Hoffman Sleepiness Score: 9 and Modified F.O.S.Q. Score Total / 2: 13 which reflects improved sleep quality over therapy time. Sleep Review of Systems: notable for Negative difficulty falling asleep; Positive awakenings at night; Negative early morning headaches; Negative memory problems; Negative concentration issues; Negative chest pain; Negative shortness of breath; Negative significant joint pain at night; Negative significant muscle pain at night; Negative rashes or itching; Negative heartburn; Negative significant mood issues; Vitals reported by patient   Patient-Reported Vitals 3/15/2023   Patient-Reported Weight 200lb   Patient-Reported Pulse 80   Patient-Reported Temperature 98.6   Patient-Reported SpO2 98   Patient-Reported Systolic  769   Patient-Reported Diastolic 70      Physical Exam completed by visual and auditory observation of patient with verbal input from patient. General:   Alert, oriented, not in acute distress   Eyes:  Anicteric Sclerae; no obvious strabismus   Nose:  No obvious nasal septum deviation    Neck:   Midline trachea, no visible mass   Chest/Lungs:  Respiratory effort normal, no visualized signs of difficulty breathing or respiratory distress   CVS:  No JVD   Extremities:  No obvious rashes noted on face, neck, or hands   Neuro:  No facial asymmetry, no focal deficits; no obvious tremor    Psych:  Normal affect,  normal countenance     Miguel Champagne is being evaluated by a Virtual Visit (video visit) encounter to address concerns as mentioned above. A caregiver was present when appropriate.  Due to this being a TeleHealth encounter (During HBQZO-85 public health emergency), evaluation of the following organ systems was limited: Vitals/Constitutional/EENT/Resp/CV/GI//MS/Neuro/Skin/Heme-Lymph-Imm. Pursuant to the emergency declaration under the [address] [place] [address] and the [employer] and [employer] Act, this Virtual Visit was conducted with patient's (and/or legal guardian's) consent, to reduce the patient's risk of exposure to COVID-19 and provide necessary medical care. The patient (or guardian if applicable) is aware that this is a billable service, which includes applicable copays. Patient identification was verified at the start of the visit: YES using name and date of birth. Patient's phone number [phone] (home)  was confirmed for accuracy. He gives permission for messages regarding results and appointments to be left at that number. Services were provided through a video synchronous discussion virtually to substitute for in-person clinic visit. I was at home while conducting this encounter, patient located at their home or alternate location of their choice. Masoud Pandey, was evaluated through a synchronous (real-time) audio-video encounter. The patient (or guardian if applicable) is aware that this is a billable service, which includes applicable co-pays. This Virtual Visit was conducted with patient's (and/or legal guardian's) consent. The visit was conducted pursuant to the emergency declaration under the [address] [place] [address] and the [employer] and [employer] Act. Patient identification was verified, and a caregiver was present when appropriate. The patient was located at: Home: [facility] [address]  The provider was located at: Home: Isaias Tsai NP on 3/15/2023 at 12:07 PM    An electronic signature was used to authenticate this note.

## 2023-10-11 ENCOUNTER — HOSPITAL ENCOUNTER (EMERGENCY)
Facility: HOSPITAL | Age: 73
Discharge: HOME | End: 2023-10-11
Attending: NURSE PRACTITIONER | Admitting: NURSE PRACTITIONER
Payer: COMMERCIAL

## 2023-10-11 ENCOUNTER — HOSPITAL ENCOUNTER (EMERGENCY)
Facility: HOSPITAL | Age: 73
Discharge: HOME | End: 2023-10-11
Admitting: NURSE PRACTITIONER
Payer: COMMERCIAL

## 2023-10-11 VITALS
HEART RATE: 61 BPM | TEMPERATURE: 97.1 F | OXYGEN SATURATION: 98 % | SYSTOLIC BLOOD PRESSURE: 121 MMHG | DIASTOLIC BLOOD PRESSURE: 72 MMHG | RESPIRATION RATE: 19 BRPM

## 2023-10-11 DIAGNOSIS — J01.90 ACUTE SINUSITIS WITH SYMPTOMS > 10 DAYS: Primary | ICD-10-CM

## 2023-10-11 DIAGNOSIS — T16.1XXA FOREIGN BODY OF RIGHT EAR, INITIAL ENCOUNTER: ICD-10-CM

## 2023-10-11 PROCEDURE — G0463 HOSPITAL OUTPT CLINIC VISIT: HCPCS

## 2023-10-11 PROCEDURE — 99213 OFFICE O/P EST LOW 20 MIN: CPT | Performed by: NURSE PRACTITIONER

## 2023-10-11 PROCEDURE — 999N000104 HC STATISTIC NO CHARGE

## 2023-10-11 ASSESSMENT — ENCOUNTER SYMPTOMS
COUGH: 1
SHORTNESS OF BREATH: 0
SINUS PAIN: 1
NECK STIFFNESS: 0
PSYCHIATRIC NEGATIVE: 1
SORE THROAT: 1
DIARRHEA: 0
NECK PAIN: 0
CHILLS: 0
EYE DISCHARGE: 0
EYE REDNESS: 0
HEADACHES: 0
MYALGIAS: 0
VOMITING: 0
FEVER: 0
TROUBLE SWALLOWING: 0
RHINORRHEA: 1
NAUSEA: 0
SINUS PRESSURE: 1

## 2023-10-11 NOTE — DISCHARGE INSTRUCTIONS
Augmentin as ordered  - Take entire course of antibiotic even if you start to feel better.  - Antibiotics can cause stomach upset including nausea and diarrhea. Read your bottle or ask the pharmacist if antibiotic can be taken with food to help prevent nausea. If you have symptoms of diarrhea you can take an over-the-counter probiotic and/or increase foods with probiotics such as yogurt, [place], sauerkraut.    Alternate tylenol and ibuprofen as needed for pain or fever    Push fluids    Follow up with primary care provider or return to urgent care/ED with any worsening in condition or additional concerns.

## 2023-10-11 NOTE — ED PROVIDER NOTES
History     Chief Complaint   Patient presents with    Foreign Body in Ear     HPI  Kenneth Guan is a 73 year old male who presents to urgent care today ambulatory with complaints of hearing aid piece stuck in right ear canal, has been stuck there since this past Friday.  Patient also having nasal congestion, rhinorrhea, sinus pain and pressure, sore throat and cough which has been ongoing for the past 2 weeks.  Patient states he recently returned home from [place] where multiple people on the trip had similar symptoms.  Also has seasonal allergies and has been taking cetirizine without any improvement prior to the trip.  Denies any fever, chills, nausea, vomiting, diarrhea, shortness of breath or chest pain.  No OTC meds.  No other concerns.    Allergies:  Allergies   Allergen Reactions    Cyclosporine      Muscle aches       Problem List:    Patient Active Problem List    Diagnosis Date Noted    Shortness of breath 05/27/2016     Priority: Medium        Past Medical History:    No past medical history on file.    Past Surgical History:    No past surgical history on file.    Family History:    No family history on file.    Social History:  Marital Status:   [2]        Medications:    amoxicillin-clavulanate (AUGMENTIN) 875-125 MG tablet  ASPIRIN ADULT LOW STRENGTH PO  ATENOLOL PO  carvedilol (COREG) 25 MG tablet  clobetasol (TEMOVATE) 0.05 % cream  glimepiride (AMARYL) 4 MG tablet  glucosamine-chondroitin 500-400 MG CAPS  HOLD MEDICATION  LISINOPRIL PO  METFORMIN HCL PO  PRAMIPEXOLE DIHYDROCHLORIDE PO  SIMVASTATIN PO  UNABLE TO FIND  UNABLE TO FIND      Review of Systems   Constitutional:  Negative for chills and fever.   HENT:  Positive for congestion, rhinorrhea, sinus pressure, sinus pain and sore throat. Negative for ear pain and trouble swallowing.         Hearing aid piece stuck in right ear canal   Eyes:  Negative for discharge and redness.   Respiratory:  Positive for cough. Negative for  shortness of breath.    Cardiovascular:  Negative for chest pain.   Gastrointestinal:  Negative for diarrhea, nausea and vomiting.   Genitourinary:  Negative for decreased urine volume.   Musculoskeletal:  Negative for gait problem, myalgias, neck pain and neck stiffness.   Skin:  Negative for rash.   Neurological:  Negative for headaches.   Psychiatric/Behavioral: Negative.       Physical Exam   BP: 121/72  Pulse: 61  Temp: 97.1  F (36.2  C)  Resp: 19  SpO2: 98 %    Physical Exam  Vitals and nursing note reviewed.   Constitutional:       General: He is not in acute distress.     Appearance: Normal appearance. He is not ill-appearing or toxic-appearing.   HENT:      Right Ear: Tympanic membrane, ear canal and external ear normal. There is no impacted cerumen.      Left Ear: Tympanic membrane, ear canal and external ear normal. There is no impacted cerumen.      Ears:      Comments: Hearing aid piece removed out of right ear canal.     Nose: Congestion and rhinorrhea present.      Right Sinus: Maxillary sinus tenderness present. No frontal sinus tenderness.      Left Sinus: Maxillary sinus tenderness present. No frontal sinus tenderness.      Mouth/Throat:      Mouth: Mucous membranes are moist.      Pharynx: Oropharynx is clear. No oropharyngeal exudate or posterior oropharyngeal erythema.   Cardiovascular:      Rate and Rhythm: Normal rate and regular rhythm.      Pulses: Normal pulses.      Heart sounds: Normal heart sounds.   Pulmonary:      Effort: Pulmonary effort is normal.      Breath sounds: Normal breath sounds.   Abdominal:      General: Bowel sounds are normal.      Palpations: Abdomen is soft.      Tenderness: There is no abdominal tenderness.   Musculoskeletal:      Cervical back: Normal range of motion and neck supple. No rigidity or tenderness.   Lymphadenopathy:      Cervical: No cervical adenopathy.   Neurological:      Mental Status: He is alert.   Psychiatric:         Mood and Affect: Mood normal.        ED Course     No results found for this or any previous visit (from the past 24 hour(s)).    Medications - No data to display    Assessments & Plan (with Medical Decision Making)     I have reviewed the nursing notes.    I have reviewed the findings, diagnosis, plan and need for follow up with the patient.  (J01.90) Acute sinusitis with symptoms > 10 days  (primary encounter diagnosis)  (T16.1XXA) Foreign body of right ear, initial encounter  Plan:   Patient ambulatory with a nontoxic appearance.  Lungs clear throughout.  No signs of otitis media, hearing aid piece removed from right ear canal, no irritation, signs of otitis media or perforated tympanic membrane.  Patient has had ongoing sinusitis for the past 2 weeks, believes he has seasonal allergies and has been taking cetirizine, also was in [place] where multiple people were sick with similar symptoms.  Maxillary sinus tenderness present.  We will treat sinusitis with Augmentin given symptoms have been ongoing for 2 weeks.  Alternate Tylenol and ibuprofen as needed for pain.  Push fluids.  Follow-up with primary care provider or return to urgent care/ED with any worsening in condition or additional concerns.  Patient in agreement with treatment plan.    Discharge Medication List as of 10/11/2023  1:55 PM        START taking these medications    Details   amoxicillin-clavulanate (AUGMENTIN) 875-125 MG tablet Take 1 tablet by mouth 2 times daily for 7 days, Disp-14 tablet, R-0, E-Prescribe           Final diagnoses:   Acute sinusitis with symptoms > 10 days   Foreign body of right ear, initial encounter     10/11/2023   [facility]       Serenity uGan, NP  10/11/23 [id number]

## 2023-10-11 NOTE — ED TRIAGE NOTES
Pt presents with c/o fb  States that part of his hearing aid stuck to his right ear. Fell asleep with it in and can't get it out     Denies any pain

## 2025-01-21 ENCOUNTER — HOSPITAL ENCOUNTER (EMERGENCY)
Facility: HOSPITAL | Age: 75
Discharge: HOME | End: 2025-01-21
Attending: EMERGENCY MEDICINE
Payer: COMMERCIAL

## 2025-01-21 ENCOUNTER — APPOINTMENT (OUTPATIENT)
Dept: GENERAL RADIOLOGY | Facility: HOSPITAL | Age: 75
End: 2025-01-21
Attending: EMERGENCY MEDICINE
Payer: COMMERCIAL

## 2025-01-21 VITALS
HEART RATE: 83 BPM | SYSTOLIC BLOOD PRESSURE: 125 MMHG | BODY MASS INDEX: 40.78 KG/M2 | WEIGHT: 288.3 LBS | OXYGEN SATURATION: 96 % | TEMPERATURE: 98.4 F | RESPIRATION RATE: 20 BRPM | DIASTOLIC BLOOD PRESSURE: 100 MMHG

## 2025-01-21 DIAGNOSIS — S91.332A GUNSHOT WOUND OF LEFT FOOT, INITIAL ENCOUNTER: ICD-10-CM

## 2025-01-21 LAB
ANION GAP SERPL CALCULATED.3IONS-SCNC: 13 MMOL/L (ref 7–15)
BASOPHILS # BLD AUTO: 0 10E3/UL (ref 0–0.2)
BASOPHILS NFR BLD AUTO: 1 %
BUN SERPL-MCNC: 8.5 MG/DL (ref 8–23)
CALCIUM SERPL-MCNC: 9.3 MG/DL (ref 8.8–10.4)
CHLORIDE SERPL-SCNC: 100 MMOL/L (ref 98–107)
CREAT SERPL-MCNC: 0.96 MG/DL (ref 0.67–1.17)
EGFRCR SERPLBLD CKD-EPI 2021: 83 ML/MIN/1.73M2
EOSINOPHIL # BLD AUTO: 0.2 10E3/UL (ref 0–0.7)
EOSINOPHIL NFR BLD AUTO: 3 %
ERYTHROCYTE [DISTWIDTH] IN BLOOD BY AUTOMATED COUNT: 13 % (ref 10–15)
GLUCOSE SERPL-MCNC: 230 MG/DL (ref 70–99)
HCO3 SERPL-SCNC: 24 MMOL/L (ref 22–29)
HCT VFR BLD AUTO: 36.4 % (ref 40–53)
HGB BLD-MCNC: 12.9 G/DL (ref 13.3–17.7)
HOLD SPECIMEN: NORMAL
IMM GRANULOCYTES # BLD: 0 10E3/UL
IMM GRANULOCYTES NFR BLD: 0 %
INR PPP: 1 (ref 0.85–1.15)
LYMPHOCYTES # BLD AUTO: 1.5 10E3/UL (ref 0.8–5.3)
LYMPHOCYTES NFR BLD AUTO: 21 %
MCH RBC QN AUTO: 33.4 PG (ref 26.5–33)
MCHC RBC AUTO-ENTMCNC: 35.4 G/DL (ref 31.5–36.5)
MCV RBC AUTO: 94 FL (ref 78–100)
MONOCYTES # BLD AUTO: 0.5 10E3/UL (ref 0–1.3)
MONOCYTES NFR BLD AUTO: 8 %
NEUTROPHILS # BLD AUTO: 4.7 10E3/UL (ref 1.6–8.3)
NEUTROPHILS NFR BLD AUTO: 67 %
NRBC # BLD AUTO: 0 10E3/UL
NRBC BLD AUTO-RTO: 0 /100
PLATELET # BLD AUTO: 160 10E3/UL (ref 150–450)
POTASSIUM SERPL-SCNC: 4.3 MMOL/L (ref 3.4–5.3)
RBC # BLD AUTO: 3.86 10E6/UL (ref 4.4–5.9)
SODIUM SERPL-SCNC: 137 MMOL/L (ref 135–145)
WBC # BLD AUTO: 6.9 10E3/UL (ref 4–11)

## 2025-01-21 PROCEDURE — 85610 PROTHROMBIN TIME: CPT | Performed by: EMERGENCY MEDICINE

## 2025-01-21 PROCEDURE — 96365 THER/PROPH/DIAG IV INF INIT: CPT

## 2025-01-21 PROCEDURE — 82565 ASSAY OF CREATININE: CPT | Performed by: EMERGENCY MEDICINE

## 2025-01-21 PROCEDURE — 36415 COLL VENOUS BLD VENIPUNCTURE: CPT | Performed by: EMERGENCY MEDICINE

## 2025-01-21 PROCEDURE — 99284 EMERGENCY DEPT VISIT MOD MDM: CPT | Performed by: EMERGENCY MEDICINE

## 2025-01-21 PROCEDURE — 73630 X-RAY EXAM OF FOOT: CPT | Mod: LT

## 2025-01-21 PROCEDURE — 250N000011 HC RX IP 250 OP 636: Performed by: EMERGENCY MEDICINE

## 2025-01-21 PROCEDURE — 82310 ASSAY OF CALCIUM: CPT | Performed by: EMERGENCY MEDICINE

## 2025-01-21 PROCEDURE — 85014 HEMATOCRIT: CPT | Performed by: EMERGENCY MEDICINE

## 2025-01-21 PROCEDURE — 99291 CRITICAL CARE FIRST HOUR: CPT | Mod: 25

## 2025-01-21 PROCEDURE — 85041 AUTOMATED RBC COUNT: CPT | Performed by: EMERGENCY MEDICINE

## 2025-01-21 PROCEDURE — 80048 BASIC METABOLIC PNL TOTAL CA: CPT | Performed by: EMERGENCY MEDICINE

## 2025-01-21 PROCEDURE — 85025 COMPLETE CBC W/AUTO DIFF WBC: CPT | Performed by: EMERGENCY MEDICINE

## 2025-01-21 PROCEDURE — 96375 TX/PRO/DX INJ NEW DRUG ADDON: CPT

## 2025-01-21 RX ORDER — HYDROMORPHONE HYDROCHLORIDE 1 MG/ML
0.5 INJECTION, SOLUTION INTRAMUSCULAR; INTRAVENOUS; SUBCUTANEOUS ONCE
Status: COMPLETED | OUTPATIENT
Start: 2025-01-21 | End: 2025-01-21

## 2025-01-21 RX ORDER — CEFAZOLIN SODIUM 2 G/100ML
2 INJECTION, SOLUTION INTRAVENOUS ONCE
Status: COMPLETED | OUTPATIENT
Start: 2025-01-21 | End: 2025-01-21

## 2025-01-21 RX ORDER — OXYCODONE AND ACETAMINOPHEN 5; 325 MG/1; MG/1
1 TABLET ORAL EVERY 6 HOURS PRN
Qty: 20 TABLET | Refills: 0 | Status: SHIPPED | OUTPATIENT
Start: 2025-01-21 | End: 2025-01-21

## 2025-01-21 RX ORDER — LIDOCAINE HYDROCHLORIDE AND EPINEPHRINE 10; 10 MG/ML; UG/ML
1 INJECTION, SOLUTION INFILTRATION; PERINEURAL ONCE
Status: COMPLETED | OUTPATIENT
Start: 2025-01-21 | End: 2025-01-21

## 2025-01-21 RX ORDER — OXYCODONE HYDROCHLORIDE 5 MG/1
5 TABLET ORAL EVERY 6 HOURS PRN
Qty: 6 TABLET | Refills: 0 | Status: SHIPPED | OUTPATIENT
Start: 2025-01-21

## 2025-01-21 RX ORDER — OXYCODONE HYDROCHLORIDE 5 MG/1
5 TABLET ORAL EVERY 6 HOURS PRN
Qty: 10 TABLET | Refills: 0 | Status: SHIPPED | OUTPATIENT
Start: 2025-01-21

## 2025-01-21 RX ADMIN — LIDOCAINE HYDROCHLORIDE,EPINEPHRINE BITARTRATE 1 ML: 10; .01 INJECTION, SOLUTION INFILTRATION; PERINEURAL at 19:57

## 2025-01-21 RX ADMIN — CEFAZOLIN SODIUM 2 G: 2 INJECTION, SOLUTION INTRAVENOUS at 19:34

## 2025-01-21 RX ADMIN — HYDROMORPHONE HYDROCHLORIDE 0.5 MG: 1 INJECTION, SOLUTION INTRAMUSCULAR; INTRAVENOUS; SUBCUTANEOUS at 19:55

## 2025-01-21 ASSESSMENT — ENCOUNTER SYMPTOMS
FEVER: 0
CHILLS: 0
SHORTNESS OF BREATH: 0

## 2025-01-21 ASSESSMENT — ACTIVITIES OF DAILY LIVING (ADL)
ADLS_ACUITY_SCORE: 41
ADLS_ACUITY_SCORE: 41

## 2025-01-22 NOTE — DISCHARGE INSTRUCTIONS
Please call [facility] tomorrow or the next business day to schedule a follow up appointment [phone]). Come back if anything looks infected. Take the augmentin twice a day and the percocet as needed. Stay in the boot until the bone doctors tell you otherwise.
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
